# Patient Record
Sex: MALE
[De-identification: names, ages, dates, MRNs, and addresses within clinical notes are randomized per-mention and may not be internally consistent; named-entity substitution may affect disease eponyms.]

---

## 2017-12-19 ENCOUNTER — NON-APPOINTMENT (OUTPATIENT)
Age: 62
End: 2017-12-19

## 2017-12-19 ENCOUNTER — APPOINTMENT (OUTPATIENT)
Dept: CARDIOLOGY | Facility: CLINIC | Age: 62
End: 2017-12-19
Payer: COMMERCIAL

## 2017-12-19 VITALS
HEIGHT: 67 IN | WEIGHT: 190 LBS | SYSTOLIC BLOOD PRESSURE: 98 MMHG | DIASTOLIC BLOOD PRESSURE: 67 MMHG | HEART RATE: 66 BPM | OXYGEN SATURATION: 94 % | BODY MASS INDEX: 29.82 KG/M2

## 2017-12-19 PROCEDURE — 93000 ELECTROCARDIOGRAM COMPLETE: CPT

## 2017-12-19 PROCEDURE — 99215 OFFICE O/P EST HI 40 MIN: CPT

## 2019-01-24 ENCOUNTER — APPOINTMENT (OUTPATIENT)
Dept: CARDIOLOGY | Facility: CLINIC | Age: 64
End: 2019-01-24
Payer: COMMERCIAL

## 2019-01-24 ENCOUNTER — NON-APPOINTMENT (OUTPATIENT)
Age: 64
End: 2019-01-24

## 2019-01-24 VITALS
HEART RATE: 72 BPM | SYSTOLIC BLOOD PRESSURE: 90 MMHG | DIASTOLIC BLOOD PRESSURE: 64 MMHG | BODY MASS INDEX: 30.13 KG/M2 | OXYGEN SATURATION: 95 % | WEIGHT: 192 LBS | HEIGHT: 67 IN

## 2019-01-24 DIAGNOSIS — R06.02 SHORTNESS OF BREATH: ICD-10-CM

## 2019-01-24 PROCEDURE — 93000 ELECTROCARDIOGRAM COMPLETE: CPT

## 2019-01-24 PROCEDURE — 99215 OFFICE O/P EST HI 40 MIN: CPT

## 2019-01-24 NOTE — PHYSICAL EXAM
[General Appearance - Well Developed] : well developed [Normal Appearance] : normal appearance [Well Groomed] : well groomed [General Appearance - Well Nourished] : well nourished [No Deformities] : no deformities [General Appearance - In No Acute Distress] : no acute distress [Normal Conjunctiva] : the conjunctiva exhibited no abnormalities [Eyelids - No Xanthelasma] : the eyelids demonstrated no xanthelasmas [Normal Oral Mucosa] : normal oral mucosa [No Oral Pallor] : no oral pallor [No Oral Cyanosis] : no oral cyanosis [Normal Jugular Venous A Waves Present] : normal jugular venous A waves present [Normal Jugular Venous V Waves Present] : normal jugular venous V waves present [No Jugular Venous Villafuerte A Waves] : no jugular venous villafuerte A waves [Respiration, Rhythm And Depth] : normal respiratory rhythm and effort [Exaggerated Use Of Accessory Muscles For Inspiration] : no accessory muscle use [Auscultation Breath Sounds / Voice Sounds] : lungs were clear to auscultation bilaterally [Abdomen Soft] : soft [Abdomen Tenderness] : non-tender [Abdomen Mass (___ Cm)] : no abdominal mass palpated [Abnormal Walk] : normal gait [Gait - Sufficient For Exercise Testing] : the gait was sufficient for exercise testing [Nail Clubbing] : no clubbing of the fingernails [Cyanosis, Localized] : no localized cyanosis [Petechial Hemorrhages (___cm)] : no petechial hemorrhages [Skin Color & Pigmentation] : normal skin color and pigmentation [] : no rash [No Venous Stasis] : no venous stasis [Skin Lesions] : no skin lesions [No Skin Ulcers] : no skin ulcer [No Xanthoma] : no  xanthoma was observed [Oriented To Time, Place, And Person] : oriented to person, place, and time [Affect] : the affect was normal [Mood] : the mood was normal [No Anxiety] : not feeling anxious [Normal] : normal [Normal Rate] : normal [Rhythm Regular] : regular [Normal S1] : normal S1 [Normal S2] : normal S2 [No Gallop] : no gallop heard [S3] : no S3 [S4] : no S4 [No Murmur] : no murmurs heard [Right Carotid Bruit] : no bruit heard over the right carotid [Left Carotid Bruit] : no bruit heard over the left carotid [Right Femoral Bruit] : no bruit heard over the right femoral artery [Left Femoral Bruit] : no bruit heard over the left femoral artery [2+] : left 2+ [Bruit] : no bruit heard [No Pitting Edema] : no pitting edema present

## 2019-01-24 NOTE — DISCUSSION/SUMMARY
[FreeTextEntry1] : Alberto had been feeling well from a cardiovascular perspective. He reports dyspnea with certain activities, but not others.\par \par I think it would be reasonable, given his risk factors, to pursue several cardiac examinations. He will have an echocardiogram and a regular stress test. Neither of these have been performed in many years. I will be in contact with him to discuss the results as they become available.

## 2019-01-24 NOTE — HISTORY OF PRESENT ILLNESS
[FreeTextEntry1] : Alberto Marie presented to the office today for a followup cardiovascular evaluation. He was last seen in the office 1 year ago.\par \par He is now 63 years old, with a family history of coronary artery disease, and a history of hypercholesterolemia. His triglycerides tend to be modestly elevated, with a low HDL and a high LDL. His LDL has been better controlled on atorvastatin, which he has been tolerating well. He has had an elevated ALT, which has been stable, without any impact from a statin.\par \par When he was last seen in the office, he was doing reasonably well, other than a single episode of lightheadedness.\par \par \par He's been feeling well. He does not do enough exercise, and has not been eating well, despite our discussions over time. He denies any chest discomfort that would suggest angina. He does admit to a degree of dyspnea. 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 When he does lesser amounts of activity, like climbing a flight of stairs or 2, he'll be much more aware of his breathing, which improves quickly. He denies any other significant symptoms of concern. He denies orthopnea, PND and lower extremity edema. He denies palpitations and syncope.  His triglyceride levels remain elevated.

## 2019-02-08 ENCOUNTER — APPOINTMENT (OUTPATIENT)
Dept: CARDIOLOGY | Facility: CLINIC | Age: 64
End: 2019-02-08
Payer: COMMERCIAL

## 2019-02-08 PROCEDURE — 93306 TTE W/DOPPLER COMPLETE: CPT

## 2019-02-22 ENCOUNTER — APPOINTMENT (OUTPATIENT)
Dept: CARDIOLOGY | Facility: CLINIC | Age: 64
End: 2019-02-22
Payer: COMMERCIAL

## 2019-02-22 PROCEDURE — 93015 CV STRESS TEST SUPVJ I&R: CPT

## 2020-09-02 ENCOUNTER — NON-APPOINTMENT (OUTPATIENT)
Age: 65
End: 2020-09-02

## 2020-09-02 ENCOUNTER — APPOINTMENT (OUTPATIENT)
Dept: CARDIOLOGY | Facility: CLINIC | Age: 65
End: 2020-09-02
Payer: MEDICARE

## 2020-09-02 VITALS
OXYGEN SATURATION: 95 % | HEIGHT: 67 IN | WEIGHT: 194 LBS | SYSTOLIC BLOOD PRESSURE: 101 MMHG | DIASTOLIC BLOOD PRESSURE: 67 MMHG | BODY MASS INDEX: 30.45 KG/M2 | HEART RATE: 61 BPM

## 2020-09-02 PROCEDURE — 93000 ELECTROCARDIOGRAM COMPLETE: CPT

## 2020-09-02 PROCEDURE — 99214 OFFICE O/P EST MOD 30 MIN: CPT

## 2020-09-02 NOTE — HISTORY OF PRESENT ILLNESS
[FreeTextEntry1] : Alberto Marie presented to the office today for a followup cardiovascular evaluation. He was last seen in the office 1 year ago.\par \par He is now 65 years old, with a family history of coronary artery disease, and a history of hypercholesterolemia. His triglycerides tend to be modestly elevated, with a low HDL and a high LDL. His LDL has been better controlled on atorvastatin, which he has been tolerating well. He has had an elevated ALT, which has been stable, without any impact from a statin.\par \par When he was last seen in the office, he was doing reasonably well.\par \par \par He's been feeling well. He does not do enough exercise, given quarantine. He denies any chest discomfort that would suggest angina. He denies any other significant symptoms of concern. He denies orthopnea, PND and lower extremity edema. He denies palpitations and syncope.

## 2020-09-02 NOTE — CARDIOLOGY SUMMARY
[Normal] : normal [No Exercise Ind Arr] : no exercise induced arrhythmias [No Ischemia] : no Ischemia [___] : [unfilled] [No Symptoms] : no Symptoms

## 2020-09-02 NOTE — PHYSICAL EXAM
[General Appearance - Well Developed] : well developed [Well Groomed] : well groomed [Normal Appearance] : normal appearance [No Deformities] : no deformities [General Appearance - In No Acute Distress] : no acute distress [General Appearance - Well Nourished] : well nourished [Normal Oral Mucosa] : normal oral mucosa [Normal Conjunctiva] : the conjunctiva exhibited no abnormalities [Eyelids - No Xanthelasma] : the eyelids demonstrated no xanthelasmas [No Oral Pallor] : no oral pallor [No Oral Cyanosis] : no oral cyanosis [Normal Jugular Venous V Waves Present] : normal jugular venous V waves present [Normal Jugular Venous A Waves Present] : normal jugular venous A waves present [No Jugular Venous Villafuerte A Waves] : no jugular venous villafuerte A waves [Respiration, Rhythm And Depth] : normal respiratory rhythm and effort [Exaggerated Use Of Accessory Muscles For Inspiration] : no accessory muscle use [Abdomen Soft] : soft [Abdomen Tenderness] : non-tender [Auscultation Breath Sounds / Voice Sounds] : lungs were clear to auscultation bilaterally [Abdomen Mass (___ Cm)] : no abdominal mass palpated [Abnormal Walk] : normal gait [Gait - Sufficient For Exercise Testing] : the gait was sufficient for exercise testing [Cyanosis, Localized] : no localized cyanosis [Nail Clubbing] : no clubbing of the fingernails [Petechial Hemorrhages (___cm)] : no petechial hemorrhages [Skin Color & Pigmentation] : normal skin color and pigmentation [No Venous Stasis] : no venous stasis [] : no rash [Skin Lesions] : no skin lesions [No Skin Ulcers] : no skin ulcer [Oriented To Time, Place, And Person] : oriented to person, place, and time [No Xanthoma] : no  xanthoma was observed [Affect] : the affect was normal [Mood] : the mood was normal [Normal] : normal [No Anxiety] : not feeling anxious [Normal Rate] : normal [Normal S2] : normal S2 [Normal S1] : normal S1 [Rhythm Regular] : regular [S3] : no S3 [No Gallop] : no gallop heard [S4] : no S4 [No Murmur] : no murmurs heard [Right Carotid Bruit] : no bruit heard over the right carotid [Left Carotid Bruit] : no bruit heard over the left carotid [Right Femoral Bruit] : no bruit heard over the right femoral artery [Left Femoral Bruit] : no bruit heard over the left femoral artery [2+] : right 2+ [Bruit] : no bruit heard [No Pitting Edema] : no pitting edema present

## 2020-09-02 NOTE — DISCUSSION/SUMMARY
[FreeTextEntry1] : Alberto had been feeling well from a cardiovascular perspective. \par \par His triglyceride over time have been a little bit elevated, but not severely so. I have requested his most recent cholesterol profile for my review, and in November, he is likely to have more blood work. I will be contacted I believe that his triglyceride warrants treatment separately.

## 2021-05-14 ENCOUNTER — NON-APPOINTMENT (OUTPATIENT)
Age: 66
End: 2021-05-14

## 2021-05-14 ENCOUNTER — APPOINTMENT (OUTPATIENT)
Dept: CARDIOLOGY | Facility: CLINIC | Age: 66
End: 2021-05-14
Payer: MEDICARE

## 2021-05-14 VITALS
OXYGEN SATURATION: 95 % | HEIGHT: 67 IN | DIASTOLIC BLOOD PRESSURE: 71 MMHG | BODY MASS INDEX: 30.61 KG/M2 | WEIGHT: 195 LBS | HEART RATE: 57 BPM | SYSTOLIC BLOOD PRESSURE: 112 MMHG

## 2021-05-14 PROCEDURE — 93000 ELECTROCARDIOGRAM COMPLETE: CPT

## 2021-05-14 PROCEDURE — 99214 OFFICE O/P EST MOD 30 MIN: CPT

## 2021-05-14 NOTE — DISCUSSION/SUMMARY
[FreeTextEntry1] : It is somewhat unclear what was causing his chest discomfort and dyspnea a few weeks ago.  His EKG and exam are unchanged, and his symptoms have resolved.  I have suggested precautionary testing, to include another echocardiogram and a stress test, with comparison made to his prior examinations from 2019.\par \par His triglycerides have remained modestly elevated, although I do not have his most recent blood work.  I have suggested an over-the-counter fish oil supplement, and a repeat set of blood work in about 3 months to reevaluate his LFTs, and his triglyceride levels. Psych/Behavioral

## 2021-05-14 NOTE — PHYSICAL EXAM
[General Appearance - Well Developed] : well developed [Normal Appearance] : normal appearance [Well Groomed] : well groomed [General Appearance - Well Nourished] : well nourished [No Deformities] : no deformities [General Appearance - In No Acute Distress] : no acute distress [Normal Conjunctiva] : the conjunctiva exhibited no abnormalities [Eyelids - No Xanthelasma] : the eyelids demonstrated no xanthelasmas [Normal Oral Mucosa] : normal oral mucosa [No Oral Pallor] : no oral pallor [No Oral Cyanosis] : no oral cyanosis [Normal Jugular Venous A Waves Present] : normal jugular venous A waves present [Normal Jugular Venous V Waves Present] : normal jugular venous V waves present [No Jugular Venous Villafuerte A Waves] : no jugular venous villafuerte A waves [Respiration, Rhythm And Depth] : normal respiratory rhythm and effort [Auscultation Breath Sounds / Voice Sounds] : lungs were clear to auscultation bilaterally [Exaggerated Use Of Accessory Muscles For Inspiration] : no accessory muscle use [Abdomen Soft] : soft [Abdomen Tenderness] : non-tender [Abdomen Mass (___ Cm)] : no abdominal mass palpated [Gait - Sufficient For Exercise Testing] : the gait was sufficient for exercise testing [Abnormal Walk] : normal gait [Nail Clubbing] : no clubbing of the fingernails [Cyanosis, Localized] : no localized cyanosis [Petechial Hemorrhages (___cm)] : no petechial hemorrhages [Skin Color & Pigmentation] : normal skin color and pigmentation [] : no rash [No Venous Stasis] : no venous stasis [Skin Lesions] : no skin lesions [No Skin Ulcers] : no skin ulcer [No Xanthoma] : no  xanthoma was observed [Oriented To Time, Place, And Person] : oriented to person, place, and time [Affect] : the affect was normal [Mood] : the mood was normal [No Anxiety] : not feeling anxious [Normal] : normal [Normal Rate] : normal [Rhythm Regular] : regular [Normal S1] : normal S1 [Normal S2] : normal S2 [No Gallop] : no gallop heard [No Murmur] : no murmurs heard [2+] : left 2+ [No Pitting Edema] : no pitting edema present [S3] : no S3 [S4] : no S4 [Right Carotid Bruit] : no bruit heard over the right carotid [Left Carotid Bruit] : no bruit heard over the left carotid [Right Femoral Bruit] : no bruit heard over the right femoral artery [Left Femoral Bruit] : no bruit heard over the left femoral artery [Bruit] : no bruit heard

## 2021-05-14 NOTE — HISTORY OF PRESENT ILLNESS
[FreeTextEntry1] : Alberto Marie presented to the office today for a followup cardiovascular evaluation. He was last seen in the office in September.\par \par He is now 66 years old, with a family history of coronary artery disease, and a history of hypercholesterolemia. His triglycerides tend to be modestly elevated, with a low HDL and a high LDL. His LDL has been better controlled on atorvastatin, which he has been tolerating well. He has had an elevated ALT, which has been stable, without any impact from a statin.\par \par When he was last seen in the office, he was doing reasonably well.\par \par He presents to the office today having had some symptoms a few weeks ago which were concerning to him.  He reports that for several days, he had a relatively constant midsternal "soreness ".  This was without clear provocation, and without clear relationship to physical activity or acute emotional stress.  He had a degree of exertional dyspnea at about the same time.  Both of these symptoms resolved after a couple of days, and have not returned.  He is relatively sedentary overall, and has a very modest amount of physical activity, though he is active at work.  With his regular day-to-day activities he has continued to feel well overall, without reproducible chest discomfort or shortness of breath suggestive of angina.  He denies orthopnea, PND and edema.  He denies palpitations, dizziness and syncope.

## 2021-07-08 ENCOUNTER — APPOINTMENT (OUTPATIENT)
Dept: CARDIOLOGY | Facility: CLINIC | Age: 66
End: 2021-07-08
Payer: MEDICARE

## 2021-07-08 PROCEDURE — 93015 CV STRESS TEST SUPVJ I&R: CPT

## 2021-07-08 PROCEDURE — 93306 TTE W/DOPPLER COMPLETE: CPT

## 2022-02-18 ENCOUNTER — NON-APPOINTMENT (OUTPATIENT)
Age: 67
End: 2022-02-18

## 2022-02-18 ENCOUNTER — APPOINTMENT (OUTPATIENT)
Dept: CARDIOLOGY | Facility: CLINIC | Age: 67
End: 2022-02-18
Payer: MEDICARE

## 2022-02-18 VITALS
DIASTOLIC BLOOD PRESSURE: 70 MMHG | HEART RATE: 69 BPM | WEIGHT: 197 LBS | HEIGHT: 67 IN | BODY MASS INDEX: 30.92 KG/M2 | SYSTOLIC BLOOD PRESSURE: 104 MMHG | OXYGEN SATURATION: 94 %

## 2022-02-18 DIAGNOSIS — R00.2 PALPITATIONS: ICD-10-CM

## 2022-02-18 PROCEDURE — 99214 OFFICE O/P EST MOD 30 MIN: CPT

## 2022-02-18 PROCEDURE — 93000 ELECTROCARDIOGRAM COMPLETE: CPT

## 2022-02-18 NOTE — HISTORY OF PRESENT ILLNESS
[FreeTextEntry1] : Alberto Marie presented to the office today for a followup cardiovascular evaluation. He was last seen in the office in May.\par \par He is now 66 years old, with a family history of coronary artery disease, and a history of hypercholesterolemia. His triglycerides tend to be modestly elevated, with a low HDL and a high LDL. His LDL has been better controlled on atorvastatin, which he has been tolerating well. He has had an elevated ALT, which has been stable, without any impact from a statin.\par \par When he was last seen in the office, he was doing reasonably well.  He did have some atypical chest discomfort, for which we performed an echocardiogram and a stress test, both of which came out well.\par \par He presents to the office today with some concerns.  His triglycerides have been somewhat elevated, noting that he has not been taking fish oil regularly.  He reports that his heart rate was felt to be accelerated, as if he were anxious, even though he was not anxious.  He went to his primary care physician for an evaluation, and he was told that he had an "extra beat ".  He was therefore referred here.  This symptom has not been present for over a month.

## 2022-02-18 NOTE — DISCUSSION/SUMMARY
[FreeTextEntry1] : It is not clear what his sense of an irregularity in his heart rhythm represented.  He has a normal examination today, and no abnormalities on his EKG.  I have suggested a precautionary Holter monitor.  If his symptoms return over the next few months, we can always consider some additional monitoring after that.\par \par I have asked that he take his fish oil supplement much more regularly, noting a triglyceride level of about 300.

## 2022-03-01 ENCOUNTER — APPOINTMENT (OUTPATIENT)
Dept: CARDIOLOGY | Facility: CLINIC | Age: 67
End: 2022-03-01
Payer: MEDICARE

## 2022-03-01 PROCEDURE — 93224 XTRNL ECG REC UP TO 48 HRS: CPT

## 2024-09-05 ENCOUNTER — APPOINTMENT (OUTPATIENT)
Dept: CARDIOLOGY | Facility: CLINIC | Age: 69
End: 2024-09-05
Payer: MEDICARE

## 2024-09-05 ENCOUNTER — NON-APPOINTMENT (OUTPATIENT)
Age: 69
End: 2024-09-05

## 2024-09-05 VITALS
OXYGEN SATURATION: 97 % | HEART RATE: 68 BPM | WEIGHT: 198 LBS | DIASTOLIC BLOOD PRESSURE: 70 MMHG | BODY MASS INDEX: 31.08 KG/M2 | SYSTOLIC BLOOD PRESSURE: 106 MMHG | HEIGHT: 67 IN

## 2024-09-05 DIAGNOSIS — E78.00 PURE HYPERCHOLESTEROLEMIA, UNSPECIFIED: ICD-10-CM

## 2024-09-05 PROCEDURE — G2211 COMPLEX E/M VISIT ADD ON: CPT

## 2024-09-05 PROCEDURE — 93000 ELECTROCARDIOGRAM COMPLETE: CPT

## 2024-09-05 PROCEDURE — 99214 OFFICE O/P EST MOD 30 MIN: CPT

## 2024-09-05 RX ORDER — ICOSAPENT ETHYL 1 G/1
1 CAPSULE ORAL
Qty: 180 | Refills: 3 | Status: ACTIVE | COMMUNITY
Start: 2024-09-05 | End: 1900-01-01

## 2024-09-05 NOTE — DISCUSSION/SUMMARY
[FreeTextEntry1] : I reviewed his most recent blood work, which revealed a triglyceride level of 219.  He did try fish oil in the past, and stopped it.  He is not sure he can take it twice a day, but he will try Vascepa at least once daily.  He will try to reduce his intake of simple carbohydrates.  I had an extensive conversation with him about the appropriate amount and type of exercise.  He will try to utilize this information.   [EKG obtained to assist in diagnosis and management of assessed problem(s)] : EKG obtained to assist in diagnosis and management of assessed problem(s)

## 2024-09-05 NOTE — PHYSICAL EXAM
[General Appearance - Well Developed] : well developed [Normal Appearance] : normal appearance [Well Groomed] : well groomed [General Appearance - Well Nourished] : well nourished [No Deformities] : no deformities [General Appearance - In No Acute Distress] : no acute distress [Normal Conjunctiva] : the conjunctiva exhibited no abnormalities [Eyelids - No Xanthelasma] : the eyelids demonstrated no xanthelasmas [Normal Oral Mucosa] : normal oral mucosa [No Oral Pallor] : no oral pallor [No Oral Cyanosis] : no oral cyanosis [Normal Jugular Venous A Waves Present] : normal jugular venous A waves present [Normal Jugular Venous V Waves Present] : normal jugular venous V waves present [No Jugular Venous Villafuerte A Waves] : no jugular venous villafuerte A waves [Respiration, Rhythm And Depth] : normal respiratory rhythm and effort [Exaggerated Use Of Accessory Muscles For Inspiration] : no accessory muscle use [Auscultation Breath Sounds / Voice Sounds] : lungs were clear to auscultation bilaterally [Abdomen Soft] : soft [Abdomen Tenderness] : non-tender [Abdomen Mass (___ Cm)] : no abdominal mass palpated [Abnormal Walk] : normal gait [Gait - Sufficient For Exercise Testing] : the gait was sufficient for exercise testing [Nail Clubbing] : no clubbing of the fingernails [Cyanosis, Localized] : no localized cyanosis [Petechial Hemorrhages (___cm)] : no petechial hemorrhages [Skin Color & Pigmentation] : normal skin color and pigmentation [] : no rash [No Venous Stasis] : no venous stasis [Skin Lesions] : no skin lesions [No Skin Ulcers] : no skin ulcer [No Xanthoma] : no  xanthoma was observed [Oriented To Time, Place, And Person] : oriented to person, place, and time [Affect] : the affect was normal [Mood] : the mood was normal [No Anxiety] : not feeling anxious [Normal] : normal [Normal Rate] : normal [Rhythm Regular] : regular [Normal S1] : normal S1 [Normal S2] : normal S2 [No Gallop] : no gallop heard [No Murmur] : no murmurs heard [2+] : left 2+ [No Pitting Edema] : no pitting edema present [S3] : no S3 [S4] : no S4 [Right Carotid Bruit] : no bruit heard over the right carotid [Left Carotid Bruit] : no bruit heard over the left carotid [Right Femoral Bruit] : no bruit heard over the right femoral artery [Left Femoral Bruit] : no bruit heard over the left femoral artery [Bruit] : no bruit heard

## 2024-09-05 NOTE — HISTORY OF PRESENT ILLNESS
[FreeTextEntry1] : Alberto Marie presented to the office today for a followup cardiovascular evaluation. He was last seen in the office in 2022.  He is now 69 years old, with a family history of coronary artery disease, and a history of hypercholesterolemia. His triglycerides tend to be modestly elevated, with a low HDL and a high LDL. His LDL has been better controlled on atorvastatin, which he has been tolerating well. He has had an elevated ALT, which has been stable, without any impact from a statin.  When he was seen in the office in February, 2022 we felt some palpitations.  His triglycerides have been elevated.  I recommended a Holter monitor, and I recommended that he be more compliant with his fish oil regimen.  A 24-hour Holter monitor was performed in March, 2022.  This revealed a sinus rhythm with minimal ectopy.  He presents to the office today with some concerns.  His triglycerides have been somewhat elevated, noting that he has not been taking fish oil regularly, and his intake of simple carbohydrates has been high.  He has not been exercising regularly, noting that he does not play softball anymore, and his dog is lazy, and does not like to go for a walk.  He does not report reproducible symptoms suggestive of angina, heart failure or arrhythmia.

## 2025-05-16 ENCOUNTER — APPOINTMENT (OUTPATIENT)
Dept: CARDIOLOGY | Facility: CLINIC | Age: 70
End: 2025-05-16
Payer: MEDICARE

## 2025-05-16 VITALS
OXYGEN SATURATION: 99 % | SYSTOLIC BLOOD PRESSURE: 108 MMHG | DIASTOLIC BLOOD PRESSURE: 64 MMHG | HEART RATE: 66 BPM | HEIGHT: 67 IN

## 2025-05-16 DIAGNOSIS — M79.604 PAIN IN RIGHT LEG: ICD-10-CM

## 2025-05-16 PROCEDURE — 99211 OFF/OP EST MAY X REQ PHY/QHP: CPT
